# Patient Record
Sex: MALE | Employment: OTHER | URBAN - METROPOLITAN AREA
[De-identification: names, ages, dates, MRNs, and addresses within clinical notes are randomized per-mention and may not be internally consistent; named-entity substitution may affect disease eponyms.]

---

## 2020-12-14 ENCOUNTER — OFFICE VISIT (OUTPATIENT)
Dept: ORTHOPEDIC SURGERY | Age: 70
End: 2020-12-14
Payer: MEDICARE

## 2020-12-14 VITALS — BODY MASS INDEX: 24.34 KG/M2 | TEMPERATURE: 97.2 F | WEIGHT: 170 LBS | HEIGHT: 70 IN

## 2020-12-14 PROCEDURE — G8427 DOCREV CUR MEDS BY ELIG CLIN: HCPCS | Performed by: ORTHOPAEDIC SURGERY

## 2020-12-14 PROCEDURE — 4040F PNEUMOC VAC/ADMIN/RCVD: CPT | Performed by: ORTHOPAEDIC SURGERY

## 2020-12-14 PROCEDURE — 99203 OFFICE O/P NEW LOW 30 MIN: CPT | Performed by: ORTHOPAEDIC SURGERY

## 2020-12-14 PROCEDURE — 1123F ACP DISCUSS/DSCN MKR DOCD: CPT | Performed by: ORTHOPAEDIC SURGERY

## 2020-12-14 PROCEDURE — 1036F TOBACCO NON-USER: CPT | Performed by: ORTHOPAEDIC SURGERY

## 2020-12-14 PROCEDURE — 3017F COLORECTAL CA SCREEN DOC REV: CPT | Performed by: ORTHOPAEDIC SURGERY

## 2020-12-14 PROCEDURE — G8420 CALC BMI NORM PARAMETERS: HCPCS | Performed by: ORTHOPAEDIC SURGERY

## 2020-12-14 PROCEDURE — G8484 FLU IMMUNIZE NO ADMIN: HCPCS | Performed by: ORTHOPAEDIC SURGERY

## 2020-12-14 RX ORDER — DICLOFENAC SODIUM 75 MG/1
75 TABLET, DELAYED RELEASE ORAL 2 TIMES DAILY
Qty: 28 TABLET | Refills: 0 | Status: SHIPPED | OUTPATIENT
Start: 2020-12-14 | End: 2021-02-15 | Stop reason: ALTCHOICE

## 2020-12-14 SDOH — HEALTH STABILITY: MENTAL HEALTH: HOW MANY STANDARD DRINKS CONTAINING ALCOHOL DO YOU HAVE ON A TYPICAL DAY?: 1 OR 2

## 2020-12-14 SDOH — HEALTH STABILITY: MENTAL HEALTH: HOW OFTEN DO YOU HAVE A DRINK CONTAINING ALCOHOL?: 2-3 TIMES A WEEK

## 2020-12-14 NOTE — PROGRESS NOTES
alcohol use of about 2.0 standard drinks of alcohol per week. He reports that he does not use drugs. Family History  Rush's family history is not on file.      Physical Exam:     Temp 97.2 °F (36.2 °C)   Ht 5' 10\" (1.778 m) Comment: per pt  Wt 170 lb (77.1 kg) Comment: per pt  BMI 24.39 kg/m²    General Appearance: alert, well appearing, and in no distress  Mental Status: alert, oriented to person, place, and time  Gait: normal    Shoulder:    Skin: warm and dry, no rash or erythema; no swelling or obvious muscular atrophy  Vasculature: 2+ radial pulses bilaterally  Neuro: Sensation grossly intact to light touch diffusely  Tenderness: Tender to palpation over the anterolateral corner of the left shoulder    ROM: (Degrees)    Right   A P   Left   A P    Elevation  140    Elevation  140 150  Abduction  155    Abduction  155  160  ER   70    ER   50 70  IR   T12    IR   T12   90 abd/ER      90 abd/ER     90 abd/IR      90 abd/IR     Crepitation  No    Crepitation No  Dyskenesia  No    Dyskenesia No      Muscle strength:    Right       Left    Deltoid   5    Deltoid   5  Supraspinatus  5    Supraspinatus  3  ER   5    ER   3  IR   5    IR   5    Special tests    Right   Rotator Cuff    Left    n   Painful arc    y   n   Pain with ER    n    n   Neer's     n    n   Hawkin's    y    n   Drop Arm    n  n   Lift off/Belly Press   n  n   ER Lag    n          AC Joint  n   AC tenderness   n  n   Cross-chest adduction  n       Labrum/biceps    n   [place]'s    y (equivocal)   n   Biceps sheer    y      n   Speed's/Yergason's   y    n   Tenderness Biceps Groove  y    n   Td's    n         Instability  n   Ant Apprehension   n    n   Post Apprehension   n    n   Ant Load shift    n    n   Post Load shift   n   n   Sulcus     n  n   Generalized Laxity   n  n   Relocation test   n  n   Crank test     n  n   Contreras-superior escape  n       Imaging:  Xrays: 4 views of the left shoulder obtained on 12/14/2020 were independently reviewed  Indications: Left shoulder pain  Findings: Mild inferior glenohumeral joint space narrowing associated with very small inferior humeral head osteophyte. Moderate osteophytic change noted at the acromioclavicular joint space associated with some joint space narrowing. Type II acromion. No obvious fracture, dislocation, or subluxation. Impression: Left shoulder radiographs with mild glenohumeral joint degenerative changes and moderate acromioclavicular joint degenerative changes. Impression/Plan:     Wanda King is a 65 y.o. old male with left shoulder pain and weakness that would suggest the presence of a rotator cuff tear. I had a discussion with the patient today educating him about this issue. Given the presence of symptoms ongoing for about a year, his age, and gradual improvement I would recommend proceeding conservatively at this time with a course of physical therapy and a short course of a prescription anti-inflammatory. He was amenable to this and had prescriptions for both provided today. I will see him back for reevaluation in 2 months. I recommended he notify me leading up to that visit if he is not getting any better so that an MRI scan can be ordered for his left shoulder. He was encouraged to return or call prior to his scheduled appointment in 2 months with any questions under concerns.         NA = Not assessed  RTC = Rotator cuff  RCT = Rotator cuff tear  ER = External rotation  IR = Internal rotation  AC = Acromioclavicular  GH = Glenohumeral  n = No  y = Yes

## 2021-02-15 ENCOUNTER — OFFICE VISIT (OUTPATIENT)
Dept: ORTHOPEDIC SURGERY | Age: 71
End: 2021-02-15
Payer: MEDICARE

## 2021-02-15 VITALS — TEMPERATURE: 98.2 F | HEIGHT: 70 IN | BODY MASS INDEX: 25.05 KG/M2 | WEIGHT: 175 LBS

## 2021-02-15 DIAGNOSIS — M25.512 CHRONIC LEFT SHOULDER PAIN: Primary | ICD-10-CM

## 2021-02-15 DIAGNOSIS — G89.29 CHRONIC LEFT SHOULDER PAIN: Primary | ICD-10-CM

## 2021-02-15 PROCEDURE — 99213 OFFICE O/P EST LOW 20 MIN: CPT | Performed by: ORTHOPAEDIC SURGERY

## 2021-02-15 PROCEDURE — 1036F TOBACCO NON-USER: CPT | Performed by: ORTHOPAEDIC SURGERY

## 2021-02-15 PROCEDURE — 3017F COLORECTAL CA SCREEN DOC REV: CPT | Performed by: ORTHOPAEDIC SURGERY

## 2021-02-15 PROCEDURE — G8419 CALC BMI OUT NRM PARAM NOF/U: HCPCS | Performed by: ORTHOPAEDIC SURGERY

## 2021-02-15 PROCEDURE — G8427 DOCREV CUR MEDS BY ELIG CLIN: HCPCS | Performed by: ORTHOPAEDIC SURGERY

## 2021-02-15 PROCEDURE — G8484 FLU IMMUNIZE NO ADMIN: HCPCS | Performed by: ORTHOPAEDIC SURGERY

## 2021-02-15 PROCEDURE — 1123F ACP DISCUSS/DSCN MKR DOCD: CPT | Performed by: ORTHOPAEDIC SURGERY

## 2021-02-15 PROCEDURE — 4040F PNEUMOC VAC/ADMIN/RCVD: CPT | Performed by: ORTHOPAEDIC SURGERY

## 2021-02-15 RX ORDER — IBUPROFEN 200 MG
400 TABLET ORAL NIGHTLY PRN
COMMUNITY

## 2021-02-15 NOTE — PROGRESS NOTES
Orthopedic Shoulder Encounter Note     Chief complaint: Left shoulder pain    HPI: Evelina Brito is a 65 y.o. left-hand dominant male who presents for reevaluation of his left shoulder. He was seen in my clinic a couple months ago at which time he presented with pain and weakness that was concerning for the presence of a rotator cuff tear. Treatment was initiated with physical therapy and he was placed on a short course of a prescription anti-inflammatory. Overall he feels that his shoulder is better. He has experienced a big difference in strength with his physical therapy and home exercise program which she has kept up with. However he continues to have quite a bit of pain at nighttime which he describes as a throbbing ache primarily over the anterior shoulder that wakes him up at least 3-4 times a night and he continues to experience weakness lifting away from his body. He also describes having a lot of popping in the shoulder with movement. Previous treatment:    NSAIDs: Ibuprofen, Voltaren    Physical Therapy: Yes    Injections: None    Surgeries: None    Review of Systems:     Constitution: no fever or chills   Pain level: 5/10  Musculoskeletal: As noted in the HPI   Neurologic: no neurologic symptoms    Past Medical History  Magalys Anna  has no past medical history on file. Past Surgical History  Magalys Anna  has no past surgical history on file. Current Medications  Current Outpatient Medications   Medication Sig Dispense Refill    ibuprofen (ADVIL;MOTRIN) 200 MG tablet Take 400 mg by mouth nightly as needed for Pain       No current facility-administered medications for this visit. Allergies  Allergies have been reviewed. Magalys Anna has No Known Allergies. Social History  Magalys Anna  reports that he has quit smoking. His smoking use included cigarettes. He has never used smokeless tobacco. He reports current alcohol use of about 2.0 standard drinks of alcohol per week.  He reports that he does not use drugs. Family History  Rush's family history is not on file. Physical Exam:     Temp 98.2 °F (36.8 °C) (Infrared)   Ht 5' 10\" (1.778 m)   Wt 175 lb (79.4 kg)   BMI 25.11 kg/m²    General Appearance: alert, well appearing, and in no distress  Mental Status: alert, oriented to person, place, and time  Gait: normal    Shoulder:    Skin: warm and dry, no rash or erythema; no swelling or obvious muscular atrophy  Vasculature: 2+ radial pulses bilaterally  Neuro: Sensation grossly intact to light touch diffusely  Tenderness: Tender to palpation over the anterior aspect of the left shoulder    ROM: (Degrees)    Right   A P   Left   A P    Elevation  145    Elevation  145   Abduction  145    Abduction  145    ER   65    ER   45   IR   T12    IR   T12   90 abd/ER      90 abd/ER     90 abd/IR      90 abd/IR     Crepitation  No    Crepitation No  Dyskenesia  No    Dyskenesia No      Muscle strength:    Right       Left    Deltoid   5    Deltoid   5  Supraspinatus  5    Supraspinatus  3  ER   5    ER   3  IR   5    IR   5    Special tests    Right   Rotator Cuff    Left    n   Painful arc    n   n   Pain with ER    n    n   Neer's     n    n   Hawkin's    n    n   Drop Arm    n  n   Lift off/Belly Press   n  n   ER Lag    n          AC Joint  n   AC tenderness   n  n   Cross-chest adduction  n       Labrum/biceps    n   [place]'s    y (equivocal)   n   Biceps sheer    n      n   Speed's/Yergason's   y    n   Tenderness Biceps Groove  y    n   Td's    n         Instability  n   Ant Apprehension   n    n   Post Apprehension   n    n   Ant Load shift    n    n   Post Load shift   n   n   Sulcus     n  n   Generalized Laxity   n  n   Relocation test   n  n   Crank test     n  n   Contreras-superior escape  n       Impression/Plan:     Bo Kay is a 65 y.o. old male with left shoulder pain and weakness concerning for the presence of a full-thickness rotator cuff tear.   He has experienced quite a bit of improvement since our last visit having undergone physical therapy and using a prescription NSAID but he continues to have shoulder pain and dysfunction which is unacceptable to him. We discussed treatment options moving forward. He is considering surgical intervention by way of an arthroscopic evaluation and possible rotator cuff repair. Consequently I would recommend moving ahead at this time with an MRI study of his shoulder. We will facilitate him getting the study completed and I will have him follow-up afterwards to review and discuss results proceeding with additional treatment recommendations at that time as indicated.         NA = Not assessed  RTC = Rotator cuff  RCT = Rotator cuff tear  ER = External rotation  IR = Internal rotation  AC = Acromioclavicular  GH = Glenohumeral  n = No  y = Yes

## 2021-02-26 ENCOUNTER — HOSPITAL ENCOUNTER (OUTPATIENT)
Dept: MRI IMAGING | Age: 71
Discharge: HOME | End: 2021-02-28
Payer: MEDICARE

## 2021-02-26 DIAGNOSIS — G89.29 CHRONIC LEFT SHOULDER PAIN: ICD-10-CM

## 2021-02-26 DIAGNOSIS — M25.512 CHRONIC LEFT SHOULDER PAIN: ICD-10-CM

## 2021-02-26 PROCEDURE — 73221 MRI JOINT UPR EXTREM W/O DYE: CPT

## 2021-03-03 ENCOUNTER — TELEPHONE (OUTPATIENT)
Dept: ORTHOPEDIC SURGERY | Age: 71
End: 2021-03-03

## 2021-03-03 NOTE — TELEPHONE ENCOUNTER
Patient calling to request a phone call from clinical staff. Patient states that he had an MRI on Friday 2/26 and would like for someone from clinical staff to call him with the results. Please advise.

## 2021-03-04 NOTE — TELEPHONE ENCOUNTER
MRI results:    Impression   1. Complete retracted full-thickness tear of supraspinatus with retraction of   the torn fibers from the insertion/footplate measuring 3.2 cm.   2. Near full-thickness high-grade partial-thickness interstitial and   articular-surface tearing of infraspinatus. 3. Up to 50% partial-thickness articular surface tearing of the insertional   fibers of subscapularis. 4. Moderate atrophy and fatty degeneration of teres minor. 5. Mild atrophy and fatty degeneration of supraspinatus, infraspinatus and   subscapularis. 6. Severe tendinosis, thinning and medial subluxation of the long head of   biceps tendon in the rotator interval.   7. Tearing and paralabral cyst formation along the inferior labrum. Underlying diffuse labral degeneration. 8. Mild glenohumeral chondromalacia.  Small glenohumeral joint effusion. 9. Mild degenerative change of the left AC joint. Patient calling to inquire.

## 2021-03-05 NOTE — TELEPHONE ENCOUNTER
Called to discuss MRI results, but left vague message as the answering machine merely stated, \"Hi, leave a message\" and did not indicate the name of the owner of the voicemail. No EMIGDIO on file stating we can leave detailed information on voicemail. Left callback number asking for patient to call and discuss on Monday.

## 2021-03-08 ENCOUNTER — TELEPHONE (OUTPATIENT)
Dept: ORTHOPEDIC SURGERY | Age: 71
End: 2021-03-08

## 2021-03-08 NOTE — TELEPHONE ENCOUNTER
Patient called back to discuss MRI results. Told him these were forwarded to the physician for review. This office will call patient back once the doctor has reviewed the MRI results and commented back. Patient is agreeable with this.

## 2021-03-08 NOTE — TELEPHONE ENCOUNTER
Closing this encounter, there is another message out about the results. Added Melody's message to the other message.

## 2021-03-08 NOTE — TELEPHONE ENCOUNTER
Patient does not have a mobile phone, Dr Jocelyne Sousa can reach the patient's wife at [phone] and speak her about the results.

## 2021-03-29 ENCOUNTER — TELEPHONE (OUTPATIENT)
Dept: ORTHOPEDIC SURGERY | Age: 71
End: 2021-03-29

## 2021-03-29 NOTE — TELEPHONE ENCOUNTER
Patient calling to request a phone call from clinical staff. He states that he'd like to have a cortisone injection in his shoulder (the same shoulder that he's scheduled to have surgery on with Dr Lashonda Rich in May). He'd like to know if this is okay or if he should wait until his surgery? Please advise.

## 2021-03-29 NOTE — TELEPHONE ENCOUNTER
Called and left message for patient. Injections can't be done within 3 months of surgery in that joint. I informed him we could push surgery out if he wished to get the injection instead.

## 2021-09-10 ENCOUNTER — OFFICE VISIT (OUTPATIENT)
Dept: ORTHOPEDIC SURGERY | Age: 71
End: 2021-09-10
Payer: MEDICARE

## 2021-09-10 VITALS — HEIGHT: 70 IN | BODY MASS INDEX: 25.05 KG/M2 | WEIGHT: 175 LBS

## 2021-09-10 DIAGNOSIS — M75.121 COMPLETE TEAR OF RIGHT ROTATOR CUFF, UNSPECIFIED WHETHER TRAUMATIC: Primary | ICD-10-CM

## 2021-09-10 PROCEDURE — 3017F COLORECTAL CA SCREEN DOC REV: CPT | Performed by: ORTHOPAEDIC SURGERY

## 2021-09-10 PROCEDURE — G8419 CALC BMI OUT NRM PARAM NOF/U: HCPCS | Performed by: ORTHOPAEDIC SURGERY

## 2021-09-10 PROCEDURE — 99212 OFFICE O/P EST SF 10 MIN: CPT | Performed by: ORTHOPAEDIC SURGERY

## 2021-09-10 PROCEDURE — 4040F PNEUMOC VAC/ADMIN/RCVD: CPT | Performed by: ORTHOPAEDIC SURGERY

## 2021-09-10 PROCEDURE — G8427 DOCREV CUR MEDS BY ELIG CLIN: HCPCS | Performed by: ORTHOPAEDIC SURGERY

## 2021-09-10 PROCEDURE — 1123F ACP DISCUSS/DSCN MKR DOCD: CPT | Performed by: ORTHOPAEDIC SURGERY

## 2021-09-10 PROCEDURE — 1036F TOBACCO NON-USER: CPT | Performed by: ORTHOPAEDIC SURGERY

## 2021-09-12 NOTE — PROGRESS NOTES
HPI: Mr. Evens Muro is a 73-year-old with a left shoulder rotator cuff tear. He comes in today wanting to have a discussion about surgery and whether or not he would need it. He states that symptomatically at this time he is doing very well really having minimal to no pain in the shoulder and being able to do everything that he would want to. He has kept up with his home exercise program.  I had a discussion with the patient today again educating him about the nature and extent of his injury and discussing options including nonoperative and operative intervention. He is very active and as already stated he has been doing very well been able to do all that he would want to. Consequently believe it is reasonable to monitor him at this time understanding that his rotator cuff tear could gradually progressively get larger. He is inclined to do so. I will have him follow-up my clinic as needed but he was encouraged to return or call at anytime with questions or concerns.